# Patient Record
Sex: FEMALE | Race: WHITE | NOT HISPANIC OR LATINO | URBAN - METROPOLITAN AREA
[De-identification: names, ages, dates, MRNs, and addresses within clinical notes are randomized per-mention and may not be internally consistent; named-entity substitution may affect disease eponyms.]

---

## 2023-12-16 ENCOUNTER — EMERGENCY (EMERGENCY)
Facility: HOSPITAL | Age: 28
LOS: 1 days | Discharge: HOME | End: 2023-12-16
Attending: EMERGENCY MEDICINE | Admitting: EMERGENCY MEDICINE
Payer: COMMERCIAL

## 2023-12-16 VITALS
RESPIRATION RATE: 16 BRPM | SYSTOLIC BLOOD PRESSURE: 129 MMHG | HEART RATE: 83 BPM | OXYGEN SATURATION: 98 % | TEMPERATURE: 98 F | DIASTOLIC BLOOD PRESSURE: 87 MMHG

## 2023-12-16 LAB
PCP SPEC-MCNC: SIGNIFICANT CHANGE UP
PCP SPEC-MCNC: SIGNIFICANT CHANGE UP

## 2023-12-16 PROCEDURE — 99284 EMERGENCY DEPT VISIT MOD MDM: CPT

## 2023-12-16 NOTE — ED ADULT TRIAGE NOTE - CHIEF COMPLAINT QUOTE
Pt walked in c/o anxiety. States did a line of coke and concerned it was laced with fentanyl. Pt denies sx at this time.

## 2023-12-17 LAB
AMPHET UR-MCNC: NEGATIVE — SIGNIFICANT CHANGE UP
AMPHET UR-MCNC: NEGATIVE — SIGNIFICANT CHANGE UP
BARBITURATES UR SCN-MCNC: NEGATIVE — SIGNIFICANT CHANGE UP
BARBITURATES UR SCN-MCNC: NEGATIVE — SIGNIFICANT CHANGE UP
BENZODIAZ UR-MCNC: NEGATIVE — SIGNIFICANT CHANGE UP
BENZODIAZ UR-MCNC: NEGATIVE — SIGNIFICANT CHANGE UP
COCAINE METAB.OTHER UR-MCNC: POSITIVE
COCAINE METAB.OTHER UR-MCNC: POSITIVE
METHADONE UR-MCNC: NEGATIVE — SIGNIFICANT CHANGE UP
METHADONE UR-MCNC: NEGATIVE — SIGNIFICANT CHANGE UP
OPIATES UR-MCNC: NEGATIVE — SIGNIFICANT CHANGE UP
OPIATES UR-MCNC: NEGATIVE — SIGNIFICANT CHANGE UP
PCP UR-MCNC: NEGATIVE — SIGNIFICANT CHANGE UP
PCP UR-MCNC: NEGATIVE — SIGNIFICANT CHANGE UP
THC UR QL: NEGATIVE — SIGNIFICANT CHANGE UP
THC UR QL: NEGATIVE — SIGNIFICANT CHANGE UP

## 2023-12-17 NOTE — ED PROVIDER NOTE - NSDCPRINTRESULTS_ED_ALL_ED
[place]
Patient requests all Lab, Cardiology, and Radiology Results on their Discharge Instructions

## 2023-12-17 NOTE — ED ADULT NURSE NOTE - NSFALLUNIVINTERV_ED_ALL_ED
Bed/Stretcher in lowest position, wheels locked, appropriate side rails in place/Call bell, personal items and telephone in reach/Instruct patient to call for assistance before getting out of bed/chair/stretcher/Non-slip footwear applied when patient is off stretcher/[place] to call system/Physically safe environment - no spills, clutter or unnecessary equipment/Purposeful proactive rounding/Room/bathroom lighting operational, light cord in reach Bed/Stretcher in lowest position, wheels locked, appropriate side rails in place/Call bell, personal items and telephone in reach/Instruct patient to call for assistance before getting out of bed/chair/stretcher/Non-slip footwear applied when patient is off stretcher/[place] to call system/Physically safe environment - no spills, clutter or unnecessary equipment/Purposeful proactive rounding/Room/bathroom lighting operational, light cord in reach

## 2023-12-17 NOTE — ED PROVIDER NOTE - CLINICAL SUMMARY MEDICAL DECISION MAKING FREE TEXT BOX
A/P: Patient presenting to the E.R. for anxiety after cocaine use  --At time of evaluation, patient is awake, alert, oriented  -- Differential includes but not limited to cocaine use, anxiety, mild dehydration  -- Patient given p.o. fluids, UDS and  -- UDS reveals no evidence of opiates or other drugs suggested  -- Advised patient to be careful in the future, hydrate and rest

## 2023-12-17 NOTE — ED PROVIDER NOTE - OBJECTIVE STATEMENT
27-year-old female with no past medical history presented to the emergency room with anxiety.  Patient states that she snorted a line of cocaine, states that she felt anxious afterwards, states that she had to go outside to get air.  States that her friends were telling her about her friend that  of a fentanyl overdose of cocaine and she became concerned that she might have consumed fentanyl.  Denies syncopal episode or near syncopal episode.  Denies any vomiting.  States that she is feeling better being in the emergency room currently.

## 2023-12-17 NOTE — ED ADULT NURSE NOTE - OBJECTIVE STATEMENT
26 y/o F here with anxiety. States did a line of coke and concerned it was laced with fentanyl. Pt denies sx at this time.

## 2023-12-17 NOTE — ED PROVIDER NOTE - PHYSICAL EXAMINATION
Constitutional:  Well appearing, awake, alert, oriented  and in no apparent distress.  HEENT: Airway patent, Nasal mucosa clear. Mouth with normal mucosa.   Eyes: Clear bilaterally, pupils equal, round and reactive to light.  Cardiac: Normal rate, regular rhythm.  Respiratory: Breath sounds clear and equal bilaterally.  Neuro: Alert and oriented, no focal deficits, no motor or sensory deficits.  Skin: Skin normal color for race, warm, dry and intact. No evidence of rash.

## 2023-12-17 NOTE — ED PROVIDER NOTE - PATIENT PORTAL LINK FT
You can access the FollowMyHealth Patient Portal offered by [facility] by registering at the following website: [email or web address]. By joining [email or web address]’s FollowMyHealth portal, you will also be able to view your health information using other applications (apps) compatible with our system. You can access the FollowMyHealth Patient Portal offered by [facility] by registering at the following website: [email or web address]. By joining [email or web address] FollowMyHealth portal, you will also be able to view your health information using other applications (apps) compatible with our system.

## 2023-12-18 DIAGNOSIS — F11.10 OPIOID ABUSE, UNCOMPLICATED: ICD-10-CM

## 2023-12-18 DIAGNOSIS — F41.9 ANXIETY DISORDER, UNSPECIFIED: ICD-10-CM

## 2025-01-27 NOTE — ED PROVIDER NOTE - PRO INTERPRETER NEED 2
English